# Patient Record
Sex: FEMALE | Race: WHITE | NOT HISPANIC OR LATINO | URBAN - METROPOLITAN AREA
[De-identification: names, ages, dates, MRNs, and addresses within clinical notes are randomized per-mention and may not be internally consistent; named-entity substitution may affect disease eponyms.]

---

## 2021-02-04 ENCOUNTER — OFFICE (OUTPATIENT)
Dept: URBAN - METROPOLITAN AREA CLINIC 75 | Facility: CLINIC | Age: 46
End: 2021-02-04
Payer: MEDICAID

## 2021-02-04 VITALS — HEART RATE: 81 BPM | TEMPERATURE: 95.1 F | WEIGHT: 188.2 LBS | HEIGHT: 59 IN | OXYGEN SATURATION: 96 %

## 2021-02-04 DIAGNOSIS — R11.0 NAUSEA: ICD-10-CM

## 2021-02-04 DIAGNOSIS — K62.5 HEMORRHAGE OF ANUS AND RECTUM: ICD-10-CM

## 2021-02-04 DIAGNOSIS — R14.0 ABDOMINAL DISTENSION (GASEOUS): ICD-10-CM

## 2021-02-04 DIAGNOSIS — K21.9 GASTRO-ESOPHAGEAL REFLUX DISEASE WITHOUT ESOPHAGITIS: ICD-10-CM

## 2021-02-04 DIAGNOSIS — K59.00 CONSTIPATION, UNSPECIFIED: ICD-10-CM

## 2021-02-04 DIAGNOSIS — R19.7 DIARRHEA, UNSPECIFIED: ICD-10-CM

## 2021-02-04 DIAGNOSIS — R10.10 UPPER ABDOMINAL PAIN, UNSPECIFIED: ICD-10-CM

## 2021-02-04 PROCEDURE — 99204 OFFICE O/P NEW MOD 45 MIN: CPT | Performed by: INTERNAL MEDICINE

## 2021-02-04 RX ORDER — METOCLOPRAMIDE 5 MG/1
TABLET ORAL
Qty: 30 | Status: COMPLETED
End: 2021-02-04

## 2021-02-04 RX ORDER — HYOSCYAMINE SULFATE 0.12 MG/1
0.5 TABLET, ORALLY DISINTEGRATING ORAL
Qty: 45 | Refills: 5 | Status: COMPLETED
Start: 2021-02-04 | End: 2021-05-20

## 2021-02-04 RX ORDER — OMEPRAZOLE 40 MG/1
40 CAPSULE, DELAYED RELEASE ORAL
Qty: 30 | Refills: 11 | Status: ACTIVE
Start: 2021-02-04

## 2021-02-04 RX ORDER — LINACLOTIDE 72 UG/1
CAPSULE, GELATIN COATED ORAL
Qty: 90 | Refills: 3 | Status: COMPLETED
Start: 2021-02-04 | End: 2022-03-24

## 2021-02-04 RX ORDER — FAMOTIDINE 20 MG/1
TABLET, FILM COATED ORAL
Qty: 30 | Status: COMPLETED
End: 2021-02-04

## 2021-04-15 ENCOUNTER — ON CAMPUS - OUTPATIENT (OUTPATIENT)
Dept: URBAN - METROPOLITAN AREA HOSPITAL 108 | Facility: HOSPITAL | Age: 46
End: 2021-04-15
Payer: MEDICAID

## 2021-04-15 DIAGNOSIS — R10.9 UNSPECIFIED ABDOMINAL PAIN: ICD-10-CM

## 2021-04-15 DIAGNOSIS — K29.50 UNSPECIFIED CHRONIC GASTRITIS WITHOUT BLEEDING: ICD-10-CM

## 2021-04-15 DIAGNOSIS — R11.0 NAUSEA: ICD-10-CM

## 2021-04-15 DIAGNOSIS — K52.9 NONINFECTIVE GASTROENTERITIS AND COLITIS, UNSPECIFIED: ICD-10-CM

## 2021-04-15 PROCEDURE — 43239 EGD BIOPSY SINGLE/MULTIPLE: CPT | Performed by: INTERNAL MEDICINE

## 2021-04-15 PROCEDURE — 45380 COLONOSCOPY AND BIOPSY: CPT | Performed by: INTERNAL MEDICINE

## 2021-05-20 ENCOUNTER — OFFICE (OUTPATIENT)
Dept: URBAN - METROPOLITAN AREA CLINIC 75 | Facility: CLINIC | Age: 46
End: 2021-05-20
Payer: MEDICAID

## 2021-05-20 VITALS
DIASTOLIC BLOOD PRESSURE: 62 MMHG | TEMPERATURE: 97.2 F | HEIGHT: 59 IN | SYSTOLIC BLOOD PRESSURE: 100 MMHG | WEIGHT: 188 LBS

## 2021-05-20 DIAGNOSIS — R11.0 NAUSEA: ICD-10-CM

## 2021-05-20 DIAGNOSIS — R19.4 CHANGE IN BOWEL HABIT: ICD-10-CM

## 2021-05-20 DIAGNOSIS — K21.9 GASTRO-ESOPHAGEAL REFLUX DISEASE WITHOUT ESOPHAGITIS: ICD-10-CM

## 2021-05-20 PROCEDURE — 99214 OFFICE O/P EST MOD 30 MIN: CPT | Performed by: NURSE PRACTITIONER

## 2021-05-20 RX ORDER — LUBIPROSTONE 8 UG/1
16 CAPSULE, GELATIN COATED ORAL
Qty: 180 | Refills: 3 | Status: COMPLETED
Start: 2021-05-20 | End: 2021-07-29

## 2021-07-29 ENCOUNTER — OFFICE (OUTPATIENT)
Dept: URBAN - METROPOLITAN AREA CLINIC 75 | Facility: CLINIC | Age: 46
End: 2021-07-29
Payer: MEDICAID

## 2021-07-29 VITALS
RESPIRATION RATE: 18 BRPM | SYSTOLIC BLOOD PRESSURE: 136 MMHG | HEART RATE: 81 BPM | OXYGEN SATURATION: 97 % | WEIGHT: 183.4 LBS | HEIGHT: 59 IN | DIASTOLIC BLOOD PRESSURE: 78 MMHG

## 2021-07-29 DIAGNOSIS — K59.00 CONSTIPATION, UNSPECIFIED: ICD-10-CM

## 2021-07-29 DIAGNOSIS — R10.10 UPPER ABDOMINAL PAIN, UNSPECIFIED: ICD-10-CM

## 2021-07-29 DIAGNOSIS — K62.5 HEMORRHAGE OF ANUS AND RECTUM: ICD-10-CM

## 2021-07-29 DIAGNOSIS — R14.0 ABDOMINAL DISTENSION (GASEOUS): ICD-10-CM

## 2021-07-29 DIAGNOSIS — K21.9 GASTRO-ESOPHAGEAL REFLUX DISEASE WITHOUT ESOPHAGITIS: ICD-10-CM

## 2021-07-29 DIAGNOSIS — R19.7 DIARRHEA, UNSPECIFIED: ICD-10-CM

## 2021-07-29 DIAGNOSIS — R11.0 NAUSEA: ICD-10-CM

## 2021-07-29 PROCEDURE — 99214 OFFICE O/P EST MOD 30 MIN: CPT | Performed by: NURSE PRACTITIONER

## 2021-07-29 RX ORDER — LUBIPROSTONE 8 UG/1
16 CAPSULE, GELATIN COATED ORAL
Qty: 180 | Refills: 3 | Status: COMPLETED
Start: 2021-07-29 | End: 2021-10-28

## 2021-10-28 ENCOUNTER — OFFICE (OUTPATIENT)
Dept: URBAN - METROPOLITAN AREA CLINIC 75 | Facility: CLINIC | Age: 46
End: 2021-10-28
Payer: MEDICAID

## 2021-10-28 VITALS
SYSTOLIC BLOOD PRESSURE: 92 MMHG | DIASTOLIC BLOOD PRESSURE: 54 MMHG | WEIGHT: 186 LBS | HEIGHT: 59 IN | HEART RATE: 104 BPM | OXYGEN SATURATION: 96 %

## 2021-10-28 DIAGNOSIS — K21.9 GASTRO-ESOPHAGEAL REFLUX DISEASE WITHOUT ESOPHAGITIS: ICD-10-CM

## 2021-10-28 DIAGNOSIS — R11.0 NAUSEA: ICD-10-CM

## 2021-10-28 PROCEDURE — 99214 OFFICE O/P EST MOD 30 MIN: CPT | Performed by: NURSE PRACTITIONER

## 2021-10-28 RX ORDER — PLECANATIDE 3 MG/1
3 TABLET ORAL
Qty: 90 | Refills: 3 | Status: COMPLETED
Start: 2021-10-28 | End: 2022-03-24

## 2022-03-24 ENCOUNTER — OFFICE (OUTPATIENT)
Dept: URBAN - METROPOLITAN AREA CLINIC 75 | Facility: CLINIC | Age: 47
End: 2022-03-24
Payer: MEDICAID

## 2022-03-24 VITALS
WEIGHT: 182 LBS | HEIGHT: 59 IN | DIASTOLIC BLOOD PRESSURE: 92 MMHG | SYSTOLIC BLOOD PRESSURE: 132 MMHG | HEART RATE: 108 BPM | OXYGEN SATURATION: 97 %

## 2022-03-24 DIAGNOSIS — R19.7 DIARRHEA, UNSPECIFIED: ICD-10-CM

## 2022-03-24 DIAGNOSIS — K59.00 CONSTIPATION, UNSPECIFIED: ICD-10-CM

## 2022-03-24 DIAGNOSIS — K21.9 GASTRO-ESOPHAGEAL REFLUX DISEASE WITHOUT ESOPHAGITIS: ICD-10-CM

## 2022-03-24 DIAGNOSIS — R11.0 NAUSEA: ICD-10-CM

## 2022-03-24 PROCEDURE — 99214 OFFICE O/P EST MOD 30 MIN: CPT | Performed by: NURSE PRACTITIONER

## 2022-06-28 ENCOUNTER — OFFICE (OUTPATIENT)
Dept: URBAN - METROPOLITAN AREA CLINIC 75 | Facility: CLINIC | Age: 47
End: 2022-06-28
Payer: MEDICAID

## 2022-06-28 ENCOUNTER — TRANSCRIBE ORDERS (OUTPATIENT)
Dept: SURGERY | Facility: SURGERY CENTER | Age: 47
End: 2022-06-28

## 2022-06-28 VITALS
SYSTOLIC BLOOD PRESSURE: 138 MMHG | WEIGHT: 170 LBS | OXYGEN SATURATION: 96 % | DIASTOLIC BLOOD PRESSURE: 64 MMHG | HEART RATE: 98 BPM | HEIGHT: 59 IN

## 2022-06-28 DIAGNOSIS — R14.0 ABDOMINAL DISTENSION (GASEOUS): ICD-10-CM

## 2022-06-28 DIAGNOSIS — K21.9 GASTRO-ESOPHAGEAL REFLUX DISEASE WITHOUT ESOPHAGITIS: ICD-10-CM

## 2022-06-28 DIAGNOSIS — R19.7 DIARRHEA, UNSPECIFIED: ICD-10-CM

## 2022-06-28 DIAGNOSIS — R10.10 UPPER ABDOMINAL PAIN, UNSPECIFIED: ICD-10-CM

## 2022-06-28 DIAGNOSIS — R11.0 NAUSEA: ICD-10-CM

## 2022-06-28 DIAGNOSIS — Z01.818 OTHER SPECIFIED PRE-OPERATIVE EXAMINATION: Primary | ICD-10-CM

## 2022-06-28 DIAGNOSIS — K59.00 CONSTIPATION, UNSPECIFIED: ICD-10-CM

## 2022-06-28 PROCEDURE — 99214 OFFICE O/P EST MOD 30 MIN: CPT | Performed by: NURSE PRACTITIONER

## 2022-08-08 RX ORDER — VERAPAMIL HYDROCHLORIDE 180 MG/1
CAPSULE, EXTENDED RELEASE ORAL
COMMUNITY

## 2022-08-08 RX ORDER — OXYCODONE HYDROCHLORIDE AND ACETAMINOPHEN 5; 325 MG/1; MG/1
TABLET ORAL
COMMUNITY

## 2022-08-08 RX ORDER — ATORVASTATIN CALCIUM 20 MG/1
TABLET, FILM COATED ORAL
COMMUNITY

## 2022-08-08 RX ORDER — PLECANATIDE 3 MG/1
TABLET ORAL
COMMUNITY

## 2022-08-08 RX ORDER — MELOXICAM 15 MG/1
TABLET ORAL
COMMUNITY

## 2022-08-08 RX ORDER — LORATADINE 10 MG/1
CAPSULE, LIQUID FILLED ORAL
COMMUNITY
Start: 2004-08-02

## 2022-08-08 RX ORDER — ACETAMINOPHEN 160 MG
TABLET,DISINTEGRATING ORAL
COMMUNITY
Start: 2021-01-18

## 2022-08-08 RX ORDER — ATOGEPANT 30 MG/1
TABLET ORAL
COMMUNITY

## 2022-08-08 RX ORDER — ALBUTEROL SULFATE 90 UG/1
AEROSOL, METERED RESPIRATORY (INHALATION) 2 TIMES DAILY
COMMUNITY

## 2022-08-08 RX ORDER — OMEPRAZOLE 40 MG/1
CAPSULE, DELAYED RELEASE ORAL
COMMUNITY

## 2022-08-08 RX ORDER — VILAZODONE HYDROCHLORIDE 40 MG/1
TABLET ORAL
COMMUNITY

## 2022-08-08 RX ORDER — ESTRADIOL 0.05 MG/D
FILM, EXTENDED RELEASE TRANSDERMAL
COMMUNITY
Start: 2022-07-26

## 2022-08-08 RX ORDER — CHLORAL HYDRATE 500 MG
CAPSULE ORAL
COMMUNITY

## 2022-08-08 RX ORDER — METFORMIN HYDROCHLORIDE 500 MG/1
TABLET, EXTENDED RELEASE ORAL
COMMUNITY

## 2022-08-08 RX ORDER — BACLOFEN 10 MG/1
TABLET ORAL
COMMUNITY

## 2022-08-08 RX ORDER — BUSPIRONE HYDROCHLORIDE 15 MG/1
TABLET ORAL
COMMUNITY

## 2022-08-08 RX ORDER — VITAMIN B COMPLEX
TABLET ORAL
COMMUNITY

## 2022-08-08 RX ORDER — GEMFIBROZIL 600 MG/1
TABLET, FILM COATED ORAL
COMMUNITY

## 2022-08-08 RX ORDER — TOPIRAMATE 50 MG/1
TABLET, FILM COATED ORAL
COMMUNITY

## 2022-08-08 RX ORDER — NORTRIPTYLINE HYDROCHLORIDE 25 MG/1
CAPSULE ORAL
COMMUNITY

## 2022-08-08 RX ORDER — PREGABALIN 300 MG/1
CAPSULE ORAL
COMMUNITY

## 2022-08-08 NOTE — SIGNIFICANT NOTE
Education provided the Patient on the following:    - Nothing to Eat or Drink after MN the night before the procedure  -Your required COVID Test is Scheduled on 8-9-22 Between the Hours of 1226-5927  -You will only be notified if your COVID test Result is POSITIVE  -The importance of reducing your number of contacts by self quarantining after you COVID test until the date of your Surgery  -You will need to have someone drive you home after your Surgery and remain with you for 24 hours after the Procedure  - The date of your procedure, your are welcome to have one visitor at bedside or remain within 10-15 minutes of [facility]  -Please wear warm socks when you arrive for your Surgery  -Remove all jewelry and leave any valuables before arriving on the date of your procedure (all will have to be removed before leaving preop)  -You will need to arrive at  on 8-11 Surgery  -Feel free to contact us at: [phone] with any additional questions/concerns

## 2022-08-09 ENCOUNTER — LAB (OUTPATIENT)
Dept: LAB | Facility: SURGERY CENTER | Age: 47
End: 2022-08-09

## 2022-08-09 DIAGNOSIS — Z01.818 OTHER SPECIFIED PRE-OPERATIVE EXAMINATION: ICD-10-CM

## 2022-08-09 LAB — SARS-COV-2 ORF1AB RESP QL NAA+PROBE: NOT DETECTED

## 2022-08-09 PROCEDURE — U0004 COV-19 TEST NON-CDC HGH THRU: HCPCS | Performed by: OTOLARYNGOLOGY

## 2022-08-11 ENCOUNTER — ANESTHESIA EVENT (OUTPATIENT)
Dept: SURGERY | Facility: SURGERY CENTER | Age: 47
End: 2022-08-11

## 2022-08-11 ENCOUNTER — HOSPITAL ENCOUNTER (OUTPATIENT)
Facility: HOSPITAL | Age: 47
Discharge: HOME | End: 2022-08-12
Attending: OTOLARYNGOLOGY | Admitting: OTOLARYNGOLOGY

## 2022-08-11 ENCOUNTER — HOSPITAL ENCOUNTER (OUTPATIENT)
Facility: SURGERY CENTER | Age: 47
Setting detail: HOSPITAL OUTPATIENT SURGERY
Discharge: HOME | End: 2022-08-11
Attending: OTOLARYNGOLOGY | Admitting: OTOLARYNGOLOGY

## 2022-08-11 ENCOUNTER — ANESTHESIA (OUTPATIENT)
Dept: SURGERY | Facility: SURGERY CENTER | Age: 47
End: 2022-08-11

## 2022-08-11 VITALS
HEIGHT: 59 IN | HEART RATE: 94 BPM | TEMPERATURE: 97.4 F | OXYGEN SATURATION: 92 % | BODY MASS INDEX: 34.47 KG/M2 | DIASTOLIC BLOOD PRESSURE: 64 MMHG | RESPIRATION RATE: 16 BRPM | SYSTOLIC BLOOD PRESSURE: 121 MMHG | WEIGHT: 171 LBS

## 2022-08-11 DIAGNOSIS — J35.01 CHRONIC TONSILLITIS: ICD-10-CM

## 2022-08-11 LAB — GLUCOSE BLDC GLUCOMTR-MCNC: 174 MG/DL (ref 70–130)

## 2022-08-11 PROCEDURE — 25010000002 DEXAMETHASONE PER 1 MG: Performed by: ANESTHESIOLOGY

## 2022-08-11 PROCEDURE — 25010000002 ONDANSETRON PER 1 MG: Performed by: ANESTHESIOLOGY

## 2022-08-11 PROCEDURE — G0378 HOSPITAL OBSERVATION PER HR: HCPCS

## 2022-08-11 PROCEDURE — 88304 TISSUE EXAM BY PATHOLOGIST: CPT | Performed by: OTOLARYNGOLOGY

## 2022-08-11 PROCEDURE — 25010000002 PROPOFOL 10 MG/ML EMULSION: Performed by: ANESTHESIOLOGY

## 2022-08-11 PROCEDURE — 25010000002 FENTANYL CITRATE (PF) 50 MCG/ML SOLUTION: Performed by: ANESTHESIOLOGY

## 2022-08-11 PROCEDURE — 25010000002 MIDAZOLAM PER 1 MG: Performed by: ANESTHESIOLOGY

## 2022-08-11 PROCEDURE — 42826 REMOVAL OF TONSILS: CPT | Performed by: OTOLARYNGOLOGY

## 2022-08-11 RX ORDER — DIPHENHYDRAMINE HYDROCHLORIDE 50 MG/ML
12.5 INJECTION INTRAMUSCULAR; INTRAVENOUS
Status: DISCONTINUED | OUTPATIENT
Start: 2022-08-11 | End: 2022-08-11 | Stop reason: HOSPADM

## 2022-08-11 RX ORDER — SODIUM CHLORIDE 0.9 % (FLUSH) 0.9 %
10 SYRINGE (ML) INJECTION AS NEEDED
Status: DISCONTINUED | OUTPATIENT
Start: 2022-08-11 | End: 2022-08-11 | Stop reason: HOSPADM

## 2022-08-11 RX ORDER — VILAZODONE HYDROCHLORIDE 40 MG/1
40 TABLET ORAL DAILY
Status: DISCONTINUED | OUTPATIENT
Start: 2022-08-12 | End: 2022-08-12 | Stop reason: HOSPADM

## 2022-08-11 RX ORDER — FENTANYL CITRATE 50 UG/ML
50 INJECTION, SOLUTION INTRAMUSCULAR; INTRAVENOUS
Status: DISCONTINUED | OUTPATIENT
Start: 2022-08-11 | End: 2022-08-11 | Stop reason: HOSPADM

## 2022-08-11 RX ORDER — DIPHENOXYLATE HYDROCHLORIDE AND ATROPINE SULFATE 2.5; .025 MG/1; MG/1
TABLET ORAL
COMMUNITY

## 2022-08-11 RX ORDER — ONDANSETRON 2 MG/ML
4 INJECTION INTRAMUSCULAR; INTRAVENOUS ONCE AS NEEDED
Status: DISCONTINUED | OUTPATIENT
Start: 2022-08-11 | End: 2022-08-11 | Stop reason: HOSPADM

## 2022-08-11 RX ORDER — FENTANYL CITRATE 50 UG/ML
INJECTION, SOLUTION INTRAMUSCULAR; INTRAVENOUS AS NEEDED
Status: DISCONTINUED | OUTPATIENT
Start: 2022-08-11 | End: 2022-08-11 | Stop reason: SURG

## 2022-08-11 RX ORDER — MIDAZOLAM HYDROCHLORIDE 1 MG/ML
1 INJECTION INTRAMUSCULAR; INTRAVENOUS
Status: DISCONTINUED | OUTPATIENT
Start: 2022-08-11 | End: 2022-08-11 | Stop reason: HOSPADM

## 2022-08-11 RX ORDER — ROCURONIUM BROMIDE 10 MG/ML
INJECTION, SOLUTION INTRAVENOUS AS NEEDED
Status: DISCONTINUED | OUTPATIENT
Start: 2022-08-11 | End: 2022-08-11 | Stop reason: SURG

## 2022-08-11 RX ORDER — FAMOTIDINE 10 MG/ML
20 INJECTION, SOLUTION INTRAVENOUS
Status: COMPLETED | OUTPATIENT
Start: 2022-08-11 | End: 2022-08-11

## 2022-08-11 RX ORDER — DEXAMETHASONE SODIUM PHOSPHATE 4 MG/ML
INJECTION, SOLUTION INTRA-ARTICULAR; INTRALESIONAL; INTRAMUSCULAR; INTRAVENOUS; SOFT TISSUE AS NEEDED
Status: DISCONTINUED | OUTPATIENT
Start: 2022-08-11 | End: 2022-08-11 | Stop reason: SURG

## 2022-08-11 RX ORDER — PREGABALIN 100 MG/1
300 CAPSULE ORAL EVERY 12 HOURS SCHEDULED
Status: DISCONTINUED | OUTPATIENT
Start: 2022-08-11 | End: 2022-08-12 | Stop reason: HOSPADM

## 2022-08-11 RX ORDER — LIDOCAINE HYDROCHLORIDE 20 MG/ML
INJECTION, SOLUTION INFILTRATION; PERINEURAL AS NEEDED
Status: DISCONTINUED | OUTPATIENT
Start: 2022-08-11 | End: 2022-08-11 | Stop reason: SURG

## 2022-08-11 RX ORDER — METFORMIN HYDROCHLORIDE 500 MG/1
1000 TABLET, EXTENDED RELEASE ORAL 2 TIMES DAILY WITH MEALS
Status: DISCONTINUED | OUTPATIENT
Start: 2022-08-11 | End: 2022-08-12 | Stop reason: HOSPADM

## 2022-08-11 RX ORDER — SODIUM CHLORIDE, SODIUM LACTATE, POTASSIUM CHLORIDE, CALCIUM CHLORIDE 600; 310; 30; 20 MG/100ML; MG/100ML; MG/100ML; MG/100ML
9 INJECTION, SOLUTION INTRAVENOUS CONTINUOUS PRN
Status: DISCONTINUED | OUTPATIENT
Start: 2022-08-11 | End: 2022-08-11 | Stop reason: HOSPADM

## 2022-08-11 RX ORDER — TOPIRAMATE 50 MG/1
50 TABLET, FILM COATED ORAL EVERY 12 HOURS SCHEDULED
Status: DISCONTINUED | OUTPATIENT
Start: 2022-08-11 | End: 2022-08-12 | Stop reason: HOSPADM

## 2022-08-11 RX ORDER — ATORVASTATIN CALCIUM 20 MG/1
20 TABLET, FILM COATED ORAL NIGHTLY
Status: DISCONTINUED | OUTPATIENT
Start: 2022-08-11 | End: 2022-08-12 | Stop reason: HOSPADM

## 2022-08-11 RX ORDER — OXYCODONE HYDROCHLORIDE AND ACETAMINOPHEN 5; 325 MG/1; MG/1
1 TABLET ORAL ONCE AS NEEDED
Status: DISCONTINUED | OUTPATIENT
Start: 2022-08-11 | End: 2022-08-11 | Stop reason: HOSPADM

## 2022-08-11 RX ORDER — DROPERIDOL 2.5 MG/ML
0.62 INJECTION, SOLUTION INTRAMUSCULAR; INTRAVENOUS ONCE AS NEEDED
Status: DISCONTINUED | OUTPATIENT
Start: 2022-08-11 | End: 2022-08-11 | Stop reason: HOSPADM

## 2022-08-11 RX ORDER — BUPIVACAINE HYDROCHLORIDE AND EPINEPHRINE 2.5; 5 MG/ML; UG/ML
INJECTION, SOLUTION EPIDURAL; INFILTRATION; INTRACAUDAL; PERINEURAL AS NEEDED
Status: DISCONTINUED | OUTPATIENT
Start: 2022-08-11 | End: 2022-08-11 | Stop reason: HOSPADM

## 2022-08-11 RX ORDER — PROPOFOL 10 MG/ML
VIAL (ML) INTRAVENOUS AS NEEDED
Status: DISCONTINUED | OUTPATIENT
Start: 2022-08-11 | End: 2022-08-11 | Stop reason: SURG

## 2022-08-11 RX ORDER — ONDANSETRON 2 MG/ML
INJECTION INTRAMUSCULAR; INTRAVENOUS AS NEEDED
Status: DISCONTINUED | OUTPATIENT
Start: 2022-08-11 | End: 2022-08-11 | Stop reason: SURG

## 2022-08-11 RX ORDER — BUSPIRONE HYDROCHLORIDE 15 MG/1
15 TABLET ORAL EVERY 12 HOURS SCHEDULED
Status: DISCONTINUED | OUTPATIENT
Start: 2022-08-11 | End: 2022-08-12 | Stop reason: HOSPADM

## 2022-08-11 RX ORDER — PANTOPRAZOLE SODIUM 40 MG/1
40 TABLET, DELAYED RELEASE ORAL
Status: DISCONTINUED | OUTPATIENT
Start: 2022-08-12 | End: 2022-08-12 | Stop reason: HOSPADM

## 2022-08-11 RX ORDER — METFORMIN HYDROCHLORIDE 500 MG/1
500 TABLET, EXTENDED RELEASE ORAL
Status: DISCONTINUED | OUTPATIENT
Start: 2022-08-12 | End: 2022-08-11

## 2022-08-11 RX ORDER — LABETALOL HYDROCHLORIDE 5 MG/ML
5 INJECTION, SOLUTION INTRAVENOUS
Status: DISCONTINUED | OUTPATIENT
Start: 2022-08-11 | End: 2022-08-11 | Stop reason: HOSPADM

## 2022-08-11 RX ORDER — BUSPIRONE HYDROCHLORIDE 15 MG/1
15 TABLET ORAL 3 TIMES DAILY
Status: DISCONTINUED | OUTPATIENT
Start: 2022-08-11 | End: 2022-08-11

## 2022-08-11 RX ORDER — SCOLOPAMINE TRANSDERMAL SYSTEM 1 MG/1
1 PATCH, EXTENDED RELEASE TRANSDERMAL CONTINUOUS
Status: DISCONTINUED | OUTPATIENT
Start: 2022-08-11 | End: 2022-08-11 | Stop reason: HOSPADM

## 2022-08-11 RX ADMIN — HYDROCODONE BITARTRATE AND ACETAMINOPHEN 15 ML: 7.5; 325 SOLUTION ORAL at 23:52

## 2022-08-11 RX ADMIN — ONDANSETRON 4 MG: 2 INJECTION INTRAMUSCULAR; INTRAVENOUS at 11:05

## 2022-08-11 RX ADMIN — FAMOTIDINE 20 MG: 10 INJECTION INTRAVENOUS at 10:40

## 2022-08-11 RX ADMIN — SODIUM CHLORIDE, SODIUM LACTATE, POTASSIUM CHLORIDE, AND CALCIUM CHLORIDE: .6; .31; .03; .02 INJECTION, SOLUTION INTRAVENOUS at 10:47

## 2022-08-11 RX ADMIN — LIDOCAINE HYDROCHLORIDE 60 MG: 20 INJECTION, SOLUTION INFILTRATION; PERINEURAL at 10:51

## 2022-08-11 RX ADMIN — VERAPAMIL HYDROCHLORIDE 180 MG: 180 TABLET, FILM COATED, EXTENDED RELEASE ORAL at 21:57

## 2022-08-11 RX ADMIN — BUSPIRONE HYDROCHLORIDE 15 MG: 15 TABLET ORAL at 21:57

## 2022-08-11 RX ADMIN — ATORVASTATIN CALCIUM 20 MG: 20 TABLET, FILM COATED ORAL at 21:57

## 2022-08-11 RX ADMIN — MIDAZOLAM 1 MG: 1 INJECTION INTRAMUSCULAR; INTRAVENOUS at 10:39

## 2022-08-11 RX ADMIN — DEXAMETHASONE SODIUM PHOSPHATE 8 MG: 4 INJECTION, SOLUTION INTRAMUSCULAR; INTRAVENOUS at 11:01

## 2022-08-11 RX ADMIN — ROCURONIUM BROMIDE 40 MG: 10 INJECTION, SOLUTION INTRAVENOUS at 10:51

## 2022-08-11 RX ADMIN — TOPIRAMATE 50 MG: 50 TABLET, FILM COATED ORAL at 23:47

## 2022-08-11 RX ADMIN — PROPOFOL 150 MG: 10 INJECTION, EMULSION INTRAVENOUS at 10:51

## 2022-08-11 RX ADMIN — SCOPALAMINE 1 PATCH: 1 PATCH, EXTENDED RELEASE TRANSDERMAL at 10:39

## 2022-08-11 RX ADMIN — PREGABALIN 300 MG: 100 CAPSULE ORAL at 21:57

## 2022-08-11 RX ADMIN — HYDROCODONE BITARTRATE AND ACETAMINOPHEN 15 ML: 7.5; 325 SOLUTION ORAL at 19:00

## 2022-08-11 RX ADMIN — FENTANYL CITRATE 25 MCG: 50 INJECTION, SOLUTION INTRAMUSCULAR; INTRAVENOUS at 10:58

## 2022-08-11 RX ADMIN — METFORMIN HYDROCHLORIDE 1000 MG: 500 TABLET, EXTENDED RELEASE ORAL at 21:57

## 2022-08-11 RX ADMIN — SUGAMMADEX 200 MG: 100 INJECTION, SOLUTION INTRAVENOUS at 11:20

## 2022-08-11 RX ADMIN — FENTANYL CITRATE 25 MCG: 50 INJECTION, SOLUTION INTRAMUSCULAR; INTRAVENOUS at 10:51

## 2022-08-11 NOTE — OP NOTE
Preop diagnosis: Chronic tonsillitis    Postop diagnosis: Same    Procedure: Tonsillectomy    Surgeon: Ronni    Blood loss: Less than 5 mL    Complications: None    Anesthesia: General    Specimen: Tonsil x2    Description: Patient was placed supine on the operating table where general anesthetic was administered.  The Yesenia John mouthgag was introduced into the oral cavity taking care not to injure the existing dentition.  Adequate visualization of the posterior pharyngeal wall was achieved.  The peritonsillar soft tissue was injected with quarter percent Marcaine 1 200,000 epinephrine, 8 mL.    The right tonsil was grasped with a curved Allis and dissected free from its bed using needlepoint cautery in the subcapsular plane.  Bleeding was minimal.  Contralateral tonsil was removed in similar fashion.    At the conclusion of the dissection, the pharynx was irrigated with saline.  Any observed bleeding was extinguished with suction electrocautery.  We observe the pharynx for at least 3 to 5 minutes to ensure the absence of bleeding.    Patient was awake return to recovery.

## 2022-08-11 NOTE — PROGRESS NOTES
After several hours in recovery, patient is unable to maintain her oxygen saturation above 90% on room air oxygen. Patient has a new diagnosis of sleep apnea but does not have CPAP machine. Patient was fairly sleepy pre-operatively before having anesthetic. Concern is that patient has received general anesthetic and may require pain medicine after tonsillectomy and cannot maintain her oxygen levels without supplemental oxygen. After discussion with surgeon, it was felt patient should be admitted for 23 hour observation.    Reviewed VLCD diet principles  Developed meal plan and reviewed product use  Ordered 2 week supply  Gave patient written VLCD education packet and left patient contact number  Will call contact patient in 2-3 days to assess tolerance

## 2022-08-11 NOTE — H&P
Patient Care Team:  Corinne Ramirez APRN as PCP - General (Family Medicine)    Chief complaint I keep getting throat infections    Subjective     Patient is a 47 y.o. female presents with chronic and recurrent tonsillitis present now for over 3 years.. Onset of symptoms was gradual starting several years ago.  Symptoms are associated with severe sore throat, tonsillar swelling, and fever.  Symptoms are aggravated by respiratory illness.   Symptoms improve with antibiotics. Severity severe context overall health quality not applicable    Review of Systems   Pertinent items are noted in HPI, all other systems reviewed and negative    History  Past Medical History:   Diagnosis Date   • Arthritis    • Asthma    • Diabetes mellitus (HCC)    • Elevated cholesterol    • Hypertension    • IBS (irritable bowel syndrome)    • Sleep apnea      Past Surgical History:   Procedure Laterality Date   • CATARACT EXTRACTION Right    • HYSTERECTOMY     • KNEE ARTHROPLASTY, PARTIAL REPLACEMENT Right    • LAPAROSCOPIC CHOLECYSTECTOMY     • LAPAROSCOPIC TUBAL LIGATION       History reviewed. No pertinent family history.  Social History     Tobacco Use   • Smoking status: Former Smoker     Types: Cigars     Quit date: 2012     Years since quitting: 10.6   Vaping Use   • Vaping Use: Every day   • Substances: Nicotine, Flavoring     E-cigarette/Vaping   • E-cigarette/Vaping Use Current Every Day User      E-cigarette/Vaping Substances   • Nicotine Yes    • Flavoring Yes      Medications Prior to Admission   Medication Sig Dispense Refill Last Dose   • albuterol sulfate  (90 Base) MCG/ACT inhaler Inhale 2 (Two) Times a Day.   8/11/2022 at Unknown time   • Atogepant (Qulipta) 30 MG tablet    8/10/2022 at Unknown time   • atorvastatin (LIPITOR) 20 MG tablet    8/10/2022 at Unknown time   • B Complex Vitamins (B-Complex/B-12) tablet    8/10/2022 at Unknown time   • baclofen (LIORESAL) 10 MG tablet    8/10/2022 at Unknown  time   • busPIRone (BUSPAR) 15 MG tablet    8/10/2022 at Unknown time   • Cholecalciferol (Vitamin D3) 50 MCG (2000 UT) capsule    8/10/2022 at Unknown time   • Ertugliflozin L-PyroglutamicAc (STEGLATRO) 15 MG tablet    8/10/2022 at Unknown time   • estradiol (MINIVELLE, VIVELLE-DOT) 0.05 MG/24HR patch       • gemfibrozil (LOPID) 600 MG tablet    8/10/2022 at Unknown time   • Loratadine 10 MG capsule    8/10/2022 at Unknown time   • meloxicam (MOBIC) 15 MG tablet    8/10/2022 at Unknown time   • metFORMIN ER (GLUCOPHAGE-XR) 500 MG 24 hr tablet    8/10/2022 at Unknown time   • Multiple Vitamin (ONE-A-DAY ADULT VITACRAVES+DHA PO)    8/10/2022 at Unknown time   • nortriptyline (PAMELOR) 25 MG capsule    8/10/2022 at Unknown time   • Omega-3 Fatty Acids (fish oil) 1000 MG capsule capsule    8/10/2022 at Unknown time   • omeprazole (priLOSEC) 40 MG capsule    8/10/2022 at Unknown time   • oxyCODONE-acetaminophen (PERCOCET) 5-325 MG per tablet    8/10/2022 at Unknown time   • Plecanatide (Trulance) 3 MG tablet    8/10/2022 at Unknown time   • Potassium 99 MG tablet    8/10/2022 at Unknown time   • pregabalin (LYRICA) 300 MG capsule    8/10/2022 at Unknown time   • promethazine (PHENERGAN) 12.5 MG half tablet    8/10/2022 at Unknown time   • topiramate (TOPAMAX) 50 MG tablet    8/10/2022 at Unknown time   • verapamil ER (VERELAN) 180 MG 24 hr capsule    8/10/2022 at Unknown time   • vilazodone (VIIBRYD) 40 MG tablet tablet    8/10/2022 at Unknown time   • multivitamin (THERAGRAN) tablet tablet Take  by mouth.      • POTASSIUM PO Take  by mouth.        Allergies:  Dust mite extract and Molds & smuts    Objective     Vital Signs  Temp:  [97.7 °F (36.5 °C)] 97.7 °F (36.5 °C)  Heart Rate:  [] 94  Resp:  [16-22] 16  BP: (101-139)/(64-89) 124/81    Physical Exam:    Lungs clear to auscultation; heart regular without a murmur; abdomen soft; extremity no edema    Results Review:    None    Assessment & Plan       * No active  hospital problems. *      Impression: Chronic recurrent tonsillitis    Plan: Tonsillectomy    I discussed the patients findings and my recommendations with patient.     Vinnie Rudolph MD  08/11/22  14:06 EDT    Time: 10 minutes

## 2022-08-11 NOTE — ANESTHESIA POSTPROCEDURE EVALUATION
"Patient: Sharda Turner    Procedure Summary     Date: 08/11/22 Room / Location: [facility]    Anesthesia Start: 1047 Anesthesia Stop: 1137    Procedure: TONSILLECTOMY (Bilateral Throat) Diagnosis:       Chronic tonsillitis      (Chronic tonsillitis [J35.01])    Surgeons: Vinnie Rudolph MD Provider: Monie Roca MD    Anesthesia Type: general ASA Status: 2          Anesthesia Type: general    Vitals  Vitals Value Taken Time   /79 08/11/22 1205   Temp 36.5 °C (97.7 °F) 08/11/22 1139   Pulse 105 08/11/22 1205   Resp 22 08/11/22 1205   SpO2 96 % 08/11/22 1205           Post Anesthesia Care and Evaluation    Patient location during evaluation: PACU  Patient participation: complete - patient participated  Level of consciousness: awake  Pain management: adequate    Airway patency: patent  Anesthetic complications: No anesthetic complications    Cardiovascular status: acceptable  Respiratory status: acceptable  Hydration status: acceptable    Comments: /81   Pulse 102   Temp 36.5 °C (97.7 °F) (Temporal)   Resp 21   Ht 149.9 cm (59\")   Wt 77.6 kg (171 lb)   SpO2 91%   BMI 34.54 kg/m²       "

## 2022-08-11 NOTE — ANESTHESIA PROCEDURE NOTES
Airway  Urgency: elective    Date/Time: 8/11/2022 10:57 AM  Airway not difficult    General Information and Staff    Patient location during procedure: OR  Anesthesiologist: Monie Roca MD    Indications and Patient Condition  Indications for airway management: airway protection    Preoxygenated: yes  MILS maintained throughout  Mask difficulty assessment: 1 - vent by mask    Final Airway Details  Final airway type: endotracheal airway      Successful airway: ETT  Cuffed: yes   Successful intubation technique: direct laryngoscopy  Endotracheal tube insertion site: oral  Blade: Nirmal  Blade size: 3  ETT size (mm): 7.0  Cormack-Lehane Classification: grade I - full view of glottis  Placement verified by: chest auscultation and capnometry   Measured from: lips  Number of attempts at approach: 1  Assessment: lips, teeth, and gum same as pre-op and atraumatic intubation

## 2022-08-11 NOTE — ANESTHESIA PREPROCEDURE EVALUATION
Anesthesia Evaluation     Patient summary reviewed and Nursing notes reviewed   NPO Solid Status: > 8 hours  NPO Liquid Status: > 2 hours           Airway   Mallampati: II  Dental - normal exam     Pulmonary - normal exam   (+) asthma,sleep apnea,   Cardiovascular - normal exam    (+) hypertension, hyperlipidemia,       Neuro/Psych  GI/Hepatic/Renal/Endo    (+) obesity,   diabetes mellitus,     Musculoskeletal     Abdominal   (+) obese,    Substance History      OB/GYN          Other                      Anesthesia Plan    ASA 2     general       Anesthetic plan, risks, benefits, and alternatives have been provided, discussed and informed consent has been obtained with: patient.        CODE STATUS:

## 2022-08-11 NOTE — DISCHARGE INSTRUCTIONS
"Dr Rudolph/Dr Herron    Tonsillectomy Discharge Instructions        Diet :  1st DAY FOLLOWING SURGERY    Nothing Red and Do not drink through a straw for 2 weeks      - Adequate fluid intake is essential to prevent dehydration.      - Although swallowing may be initially painful, patient needs to encouraged to drink an ample volume of fluids such as:                           Chi-Aid      Popsicles    Soft Drinks    Jello   Tea   Soups   Ice Cream  Milkshakes         *** A mild elevation of temperature following surgery usually means inadequate fluid intake.  Avoid citrus juices      2nd DAY AFTER SURGERY:       - Gradually add soft easy to chew food such as:                                         Soft cereals ( cream of wheat, etc,)   Scrambled eggs  Chopped Meats  Rice  Macaroni   Mashed Potatoes                            Avoid \"highly\" seasoned foods which require a great deal of chewing before swallowing      5th - 7th DAYS AFTER SURGERY                                      Gradually resume regular diet.            ACTIVITIES:                 Although bed rest is not required, quiet activities are recommended.  For the 1st two weeks following tonsillectomy.  Strenuous physical exercise ( sports, or heavy work) is discouraged     since since tonsillar hemorrhage may result.  If bleeding occurs, most likely it will be 7-10 days after surgery.        SCHOOL:               School may be resumed in 5-10 days following surgery, if patient is doing well.  GYM may be resumed in 2 weeks.      WORK :                Generally 5-10 days following surgery.  However, if physical labor is required, work may be resumed in 2 weeks,      PAIN:             Sore throat, sometimes considerable, is to be expected following tonsillectomy.  Drink adequate amounts of fluids each days will lesson the painful swallowing.                 Frequently, ear ache is experienced following tonsillectomy.  This ear ache is not an ear infection, " but originates from the sore throat.  The ear ache  may be treated with a heating pad or hot water bottle applied to the ear and with pain medication.  Chewing gum for the first 3-5 days after surgery with help reduce the sore throat and ear ache.          BLEEDING:            Please call my office for ANY bleeding from the mouth or nose or vomiting of blood.        FOLLOW-UP APPOINTMENT:                   Please call the office to schedule a follow up appointment following surgery at [phone].        OTHER INSTRUCTIONS AND INFORMATION:      1. Gargling is not necessary.      2. Brushing teeth may be resumed immediately.      3  Following surgery, a whitish-gray shaggy scab covers the tonsillar area until healing.       4. Aspirin or aspirin-containing products should not be used for 2 weeks before and after tonsillectomy.      5. Please call my office with repeated vomiting, persistant cough, and temperature elevated above 101 degrees.

## 2022-08-12 VITALS
TEMPERATURE: 98.1 F | DIASTOLIC BLOOD PRESSURE: 70 MMHG | SYSTOLIC BLOOD PRESSURE: 101 MMHG | RESPIRATION RATE: 18 BRPM | OXYGEN SATURATION: 91 % | HEART RATE: 83 BPM | WEIGHT: 171 LBS | BODY MASS INDEX: 34.47 KG/M2 | HEIGHT: 59 IN

## 2022-08-12 PROBLEM — J35.01 CHRONIC TONSILLITIS: Status: ACTIVE | Noted: 2022-08-12

## 2022-08-12 LAB
LAB AP CASE REPORT: NORMAL
PATH REPORT.FINAL DX SPEC: NORMAL
PATH REPORT.GROSS SPEC: NORMAL

## 2022-08-12 PROCEDURE — G0378 HOSPITAL OBSERVATION PER HR: HCPCS

## 2022-08-12 RX ADMIN — PANTOPRAZOLE SODIUM 40 MG: 40 TABLET, DELAYED RELEASE ORAL at 05:32

## 2022-08-12 RX ADMIN — PREGABALIN 300 MG: 100 CAPSULE ORAL at 09:55

## 2022-08-12 RX ADMIN — TOPIRAMATE 50 MG: 50 TABLET, FILM COATED ORAL at 09:52

## 2022-08-12 RX ADMIN — HYDROCODONE BITARTRATE AND ACETAMINOPHEN 15 ML: 7.5; 325 SOLUTION ORAL at 09:56

## 2022-08-12 RX ADMIN — VILAZODONE HYDROCHLORIDE 40 MG: 40 TABLET ORAL at 09:52

## 2022-08-12 RX ADMIN — METFORMIN HYDROCHLORIDE 1000 MG: 500 TABLET, EXTENDED RELEASE ORAL at 09:52

## 2022-08-12 RX ADMIN — BUSPIRONE HYDROCHLORIDE 15 MG: 15 TABLET ORAL at 09:52

## 2022-08-12 NOTE — PLAN OF CARE
Goal Outcome Evaluation:  Plan of Care Reviewed With: patient        Progress: improving  Outcome Evaluation: Patient has been resting comfortably this shift. Has had several cups of pudding and tolerating well. Took all hs meds whole in pudding. Vital signs stable. O2 removed at start of shift and patient has been sating above 90%. No bleeding or drainage from throat. Narciso throat is red and scab like in appearance. Patient states it hurts to swallow but no resp discomfort. Vital signs stable. Call light in reach. Safety maintained.

## 2022-08-12 NOTE — PROGRESS NOTES
Discharge Planning Assessment  [facility]     Patient Name: Sharda Turner  MRN: [id number]  Today's Date: 8/12/2022    Admit Date: 8/11/2022     Discharge Needs Assessment    No documentation.                Discharge Plan     Row Name 08/12/22 1724       Plan    Plan home    Final Discharge Disposition Code 01 - home or self-care    Final Note home              Continued Care and Services - Discharged on 8/12/2022 Admission date: 8/11/2022 - Discharge disposition: Home or Self Care   Coordination has not been started for this encounter.       Expected Discharge Date and Time     Expected Discharge Date Expected Discharge Time    Aug 12, 2022          Demographic Summary    No documentation.                Functional Status    No documentation.                Psychosocial    No documentation.                Abuse/Neglect    No documentation.                Legal    No documentation.                Substance Abuse    No documentation.                Patient Forms    No documentation.                   Claudia Mckeon RN

## 2022-08-12 NOTE — NURSING NOTE
"Entered patient's room to give am medications and noted an e-cig on her bedside table. Upon inquiry, patient stated she wanted to go outside, she \"was crawling up the wall.\" Education provided on hospital policy as well as best practices for healing quickly. Patient rolled her eyes and stated she smoked after her wisdom teeth were pulled. This RN continued to educate on risks of smoking. Patient stated she did not ask to be sent here over night and had they just let her go home no one would know. RN reassured patient that it would be best for her to not smoke and that we could provide a snack or some other distraction. Patient refused. Asked patient to put e-cig away or allow RN to lock away until discharge. Patient placed in purse.   "

## 2022-08-12 NOTE — CASE MANAGEMENT/SOCIAL WORK
Discharge Planning Assessment  [facility]     Patient Name: Sharda Turner  MRN: [id number]  Today's Date: 8/12/2022    Admit Date: 8/11/2022     Discharge Needs Assessment     Row Name 08/12/22 1013       Living Environment    People in Home significant other;child(emily), adult    Name(s) of People in Home significant other Chintan Nayak [phone] and her 18 year old daughter    Current Living Arrangements home    Primary Care Provided by self;spouse/significant other    Provides Primary Care For no one, unable/limited ability to care for self    Family Caregiver if Needed child(emily), adult;significant other    Family Caregiver Names significant other Chintan Nayak [phone] and her 18 year old daughter    Quality of Family Relationships helpful;involved;supportive    Able to Return to Prior Arrangements yes       Resource/Environmental Concerns    Resource/Environmental Concerns home accessibility    Home Accessibility Concerns stairs to enter home  2 steps with no handrails to enter her 2 story home where she has everything that she needs on the main level of the home.    Transportation Concerns no car       Transition Planning    Patient/Family Anticipates Transition to home with family    Patient/Family Anticipated Services at Transition none    Transportation Anticipated family or friend will provide       Discharge Needs Assessment    Equipment Currently Used at Home rollator    Concerns to be Addressed denies needs/concerns at this time    Anticipated Changes Related to Illness none    Equipment Needed After Discharge none    Current Discharge Risk physical impairment               Discharge Plan     Row Name 08/12/22 1012       Plan    Plan Plans home; denies needs.    Patient/Family in Agreement with Plan yes    Provided Post Acute Provider List? N/A    N/A Provider List Comment Denies any HH/SNF needs.    Provided Post Acute Provider Quality & Resource List? N/A    N/A Quality & Resource List  Comment Denies any HH/SNF needs.    Plan Comments Met with the patient at bedside; explained role of CCP, verified facesheet and discussed discharge planning needs. The patient plans to return home upon d/c with assistance from her significant other Chintan Nayak [phone] and her 18 year old daughter. The patient has a rollator and 2 steps with no handrails to enter her 2 story home where she has everything that she needs on the main level of the home.  The patient’s PCP is Corinne Ramirez, pharmacy is [facility] on [address] and she is agreeable to using [facility].  The patient denies any trouble remembering to take her medication or with affording her medication.  The patient denies any HH/SNF or POA documents.  The patient states that she used [employer] for transportation to her medical appointments and that her mother in law or best friend will transport her home upon d/c.  The patient currently denies any d/c needs. CCP will follow to assist with any d/c needs that may arise. ALLEN BATRES              Continued Care and Services - Admitted Since 8/11/2022    Coordination has not been started for this encounter.          Demographic Summary     Row Name 08/12/22 1012       General Information    Admission Type other (see comments)  Outpatient    Arrived From home    Referral Source admission list    Reason for Consult discharge planning    Preferred Language English               Functional Status     Row Name 08/12/22 1013       Functional Status    Usual Activity Tolerance moderate    Current Activity Tolerance fair       Functional Status, IADL    Medications assistive equipment    Meal Preparation assistive equipment    Housekeeping assistive equipment    Laundry assistive equipment    Shopping assistive equipment       Mental Status    General Appearance WDL WDL       Mental Status Summary    Recent Changes in Mental Status/Cognitive Functioning no changes               Psychosocial    No documentation.                 Abuse/Neglect    No documentation.                Legal    No documentation.                Substance Abuse    No documentation.                Patient Forms    No documentation.                   ALLEN Scott

## 2022-08-23 NOTE — H&P
Patient Care Team:  Corinne Ramirez APRN as PCP - General (Family Medicine)    Chief complaint I cannot keep my oxygen up    Subjective     Patient is a 47 y.o. female presents with persistent hypoxemia following tonsillectomy.. Onset of symptoms was abrupt starting a few hours ago.  Symptoms are associated with persistent hypoxemia and sedation.  Symptoms are aggravated by falling asleep.   Symptoms improve with oxygen supplementation. Severity moderate context overall cardiovascular quality not applicable    Review of Systems   Pertinent items are noted in HPI, all other systems reviewed and negative    History  Past Medical History:   Diagnosis Date   • Arthritis    • Asthma    • Diabetes mellitus (HCC)    • Elevated cholesterol    • Hypertension    • IBS (irritable bowel syndrome)    • Sleep apnea      Past Surgical History:   Procedure Laterality Date   • CATARACT EXTRACTION Right    • HYSTERECTOMY     • KNEE ARTHROPLASTY, PARTIAL REPLACEMENT Right    • LAPAROSCOPIC CHOLECYSTECTOMY     • LAPAROSCOPIC TUBAL LIGATION     • TONSILLECTOMY     • TONSILLECTOMY Bilateral 8/11/2022    Procedure: TONSILLECTOMY;  Surgeon: Vinnie Rudolph MD;  Location: [facility] MAIN OR;  Service: ENT;  Laterality: Bilateral;     History reviewed. No pertinent family history.  Social History     Tobacco Use   • Smoking status: Former Smoker     Types: Cigars     Quit date: 2012     Years since quitting: 10.6   Vaping Use   • Vaping Use: Every day   • Substances: Nicotine, Flavoring   Substance Use Topics   • Alcohol use: Never   • Drug use: Never     E-cigarette/Vaping   • E-cigarette/Vaping Use Current Every Day User      E-cigarette/Vaping Substances   • Nicotine Yes    • Flavoring Yes      No medications prior to admission.     Allergies:  Dust mite extract and Molds & smuts    Objective     Vital Signs       Physical Exam:    Lungs clear to auscultation; patient is somewhat somnolent; oxygen desaturation noted;  oropharynx shows postsurgical changes as expected; abdomen soft; heart regular without a murmur    Results Review:    None    Assessment & Plan       Chronic tonsillitis      Impression: Mild hypoxemia following tonsillectomy    Plan: Admission for observation and oxygen supplementation as indicated    I discussed the patients findings and my recommendations with patient.     Vinnie Rudolph MD  08/23/22  08:11 EDT    Time: 15 minutes

## 2022-08-23 NOTE — DISCHARGE SUMMARY
Date of Discharge:  8/23/2022    Discharge Diagnosis: Obstructive sleep apnea    Presenting Problem/History of Present Illness  Active Hospital Problems    Diagnosis  POA   • **Chronic tonsillitis [J35.01]  Yes      Resolved Hospital Problems   No resolved problems to display.        Patient was admitted postoperatively after oxygen desaturation following tonsillectomy.  Hospital Course  Patient is a 47 y.o. female presented with significant recurrent tonsillitis in a patient with moderate obstructive sleep apnea.  Patient underwent uneventful tonsillectomy at [facility] and was transferred for overnight observation due to persistent mild hypoxemia.      Procedures Performed         Consults:   Consults     No orders found from 7/13/2022 to 8/12/2022.          Pertinent Test Results: None    Condition on Discharge: Satisfactory    Vital Signs       Physical Exam:   No exam performed today,    Discharge Disposition  Home or Self Care    Discharge Medications     Discharge Medications      Continue These Medications      Instructions Start Date   albuterol sulfate  (90 Base) MCG/ACT inhaler  Commonly known as: PROVENTIL HFA;VENTOLIN HFA;PROAIR HFA   Inhalation, 2 Times Daily      atorvastatin 20 MG tablet  Commonly known as: LIPITOR   No dose, route, or frequency recorded.      B-Complex/B-12 tablet   No dose, route, or frequency recorded.      baclofen 10 MG tablet  Commonly known as: LIORESAL   No dose, route, or frequency recorded.      busPIRone 15 MG tablet  Commonly known as: BUSPAR   No dose, route, or frequency recorded.      Ertugliflozin L-PyroglutamicAc 15 MG tablet  Commonly known as: STEGLATRO   No dose, route, or frequency recorded.      estradiol 0.05 MG/24HR patch  Commonly known as: MINIVELLE, VIVELLE-DOT   No dose, route, or frequency recorded.      fish oil 1000 MG capsule capsule   No dose, route, or frequency recorded.      gemfibrozil 600 MG tablet  Commonly known as: LOPID   No  dose, route, or frequency recorded.      HYDROcodone-acetaminophen 7.5-325 MG/15ML solution  Commonly known as: HYCET   15 mL, Oral, Every 4 Hours PRN      Loratadine 10 MG capsule   No dose, route, or frequency recorded.      meloxicam 15 MG tablet  Commonly known as: MOBIC   No dose, route, or frequency recorded.      metFORMIN  MG 24 hr tablet  Commonly known as: GLUCOPHAGE-XR   No dose, route, or frequency recorded.      nortriptyline 25 MG capsule  Commonly known as: PAMELOR   No dose, route, or frequency recorded.      omeprazole 40 MG capsule  Commonly known as: priLOSEC   No dose, route, or frequency recorded.      ONE-A-DAY ADULT VITACRAVES+DHA PO   No dose, route, or frequency recorded.      multivitamin tablet tablet   Oral      oxyCODONE-acetaminophen 5-325 MG per tablet  Commonly known as: PERCOCET   No dose, route, or frequency recorded.      Potassium 99 MG tablet   No dose, route, or frequency recorded.      POTASSIUM PO   Oral      pregabalin 300 MG capsule  Commonly known as: LYRICA   No dose, route, or frequency recorded.      promethazine 12.5 MG half tablet  Commonly known as: PHENERGAN   No dose, route, or frequency recorded.      Qulipta 30 MG tablet  Generic drug: Atogepant   No dose, route, or frequency recorded.      topiramate 50 MG tablet  Commonly known as: TOPAMAX   No dose, route, or frequency recorded.      Trulance 3 MG tablet  Generic drug: Plecanatide   No dose, route, or frequency recorded.      verapamil  MG 24 hr capsule  Commonly known as: VERELAN   No dose, route, or frequency recorded.      vilazodone 40 MG tablet tablet  Commonly known as: VIIBRYD   No dose, route, or frequency recorded.      Vitamin D3 50 MCG (2000 UT) capsule   No dose, route, or frequency recorded.             Discharge Diet:   Diet Instructions     Diet:      Diet Texture / Consistency: Adult T & A          Activity at Discharge:   Activity Instructions     Activity as Tolerated      No  strenuous activity for 2 weeks.          Follow-up Appointments  No future appointments.  Additional Instructions for the Follow-ups that You Need to Schedule     Discharge Follow-up with Specified Provider: Office; 1 Month   As directed      To: Office    Follow Up: 1 Month               Test Results Pending at Discharge       Vinnie Rudolph MD  08/23/22  08:09 EDT    Time: 10 minutes

## 2023-01-18 ENCOUNTER — OFFICE (OUTPATIENT)
Dept: URBAN - METROPOLITAN AREA CLINIC 76 | Facility: CLINIC | Age: 48
End: 2023-01-18
Payer: MEDICAID

## 2023-01-18 VITALS
HEIGHT: 59 IN | HEART RATE: 108 BPM | DIASTOLIC BLOOD PRESSURE: 64 MMHG | SYSTOLIC BLOOD PRESSURE: 98 MMHG | WEIGHT: 171 LBS | SYSTOLIC BLOOD PRESSURE: 94 MMHG | DIASTOLIC BLOOD PRESSURE: 66 MMHG | OXYGEN SATURATION: 96 %

## 2023-01-18 DIAGNOSIS — K59.00 CONSTIPATION, UNSPECIFIED: ICD-10-CM

## 2023-01-18 DIAGNOSIS — R11.0 NAUSEA: ICD-10-CM

## 2023-01-18 DIAGNOSIS — K21.9 GASTRO-ESOPHAGEAL REFLUX DISEASE WITHOUT ESOPHAGITIS: ICD-10-CM

## 2023-01-18 PROCEDURE — 99214 OFFICE O/P EST MOD 30 MIN: CPT | Performed by: NURSE PRACTITIONER

## 2023-01-18 RX ORDER — PLECANATIDE 3 MG/1
3 TABLET ORAL
Qty: 90 | Refills: 3 | Status: ACTIVE

## (undated) DEVICE — PK ENT 46

## (undated) DEVICE — NDL SPINE 25G 31/2IN BLU

## (undated) DEVICE — SPNG DISSCT SECTO TONSL MD PK/5

## (undated) DEVICE — ULTRACLEAN ACCESSORY ELECTRODE 1" (2.54 CM) COATED NEEDLE: Brand: ULTRACLEAN

## (undated) DEVICE — COAGULATOR SXN FTSWTCH 10F6IN

## (undated) DEVICE — CONMED GOLDLINE ELECTROSURGICAL HANDPIECE, HAND CONTROLLED WITH ULTRACLEAN BLADE ELECTRODE, ROCKER SWITCH, SAFETY HOLSTER AND 10' (3 M) CABLE: Brand: CONMED GOLDLINE

## (undated) DEVICE — STERILE POLYISOPRENE POWDER-FREE SURGICAL GLOVES: Brand: PROTEXIS

## (undated) DEVICE — 9165 UNIVERSAL PATIENT PLATE: Brand: 3M™

## (undated) DEVICE — KT ANTI FOG W/FLD AND SPNG

## (undated) DEVICE — CATH URETH AP 10F

## (undated) DEVICE — FLEX ADVANTAGE 1500CC: Brand: FLEX ADVANTAGE